# Patient Record
Sex: FEMALE | Race: WHITE | Employment: OTHER | URBAN - METROPOLITAN AREA
[De-identification: names, ages, dates, MRNs, and addresses within clinical notes are randomized per-mention and may not be internally consistent; named-entity substitution may affect disease eponyms.]

---

## 2017-01-28 ENCOUNTER — HOSPITAL ENCOUNTER (EMERGENCY)
Age: 77
Discharge: HOME | End: 2017-01-28
Attending: EMERGENCY MEDICINE
Payer: MEDICAID

## 2017-01-28 VITALS
DIASTOLIC BLOOD PRESSURE: 51 MMHG | WEIGHT: 129 LBS | RESPIRATION RATE: 18 BRPM | SYSTOLIC BLOOD PRESSURE: 123 MMHG | BODY MASS INDEX: 24.35 KG/M2 | OXYGEN SATURATION: 97 % | HEIGHT: 61 IN | TEMPERATURE: 97.9 F | HEART RATE: 90 BPM

## 2017-01-28 DIAGNOSIS — R82.81 PYURIA: Primary | ICD-10-CM

## 2017-01-28 LAB
ALBUMIN SERPL BCP-MCNC: 3.1 G/DL (ref 3.2–4.6)
ALBUMIN/GLOB SERPL: 0.8 {RATIO} (ref 1.2–3.5)
ALP SERPL-CCNC: 36 U/L (ref 50–136)
ALT SERPL-CCNC: 15 U/L (ref 12–65)
ANION GAP BLD CALC-SCNC: 10 MMOL/L (ref 7–16)
AST SERPL W P-5'-P-CCNC: 16 U/L (ref 15–37)
BACTERIA URNS QL MICRO: 0 /HPF
BASOPHILS # BLD AUTO: 0 K/UL (ref 0–0.2)
BASOPHILS # BLD: 0 % (ref 0–2)
BILIRUB SERPL-MCNC: 0.5 MG/DL (ref 0.2–1.1)
BUN SERPL-MCNC: 24 MG/DL (ref 8–23)
CALCIUM SERPL-MCNC: 9.1 MG/DL (ref 8.3–10.4)
CASTS URNS QL MICRO: NORMAL /LPF
CHLORIDE SERPL-SCNC: 101 MMOL/L (ref 98–107)
CO2 SERPL-SCNC: 28 MMOL/L (ref 21–32)
CREAT SERPL-MCNC: 1.76 MG/DL (ref 0.6–1)
CRYSTALS URNS QL MICRO: 0 /LPF
DIFFERENTIAL METHOD BLD: ABNORMAL
EOSINOPHIL # BLD: 0 K/UL (ref 0–0.8)
EOSINOPHIL NFR BLD: 0 % (ref 0.5–7.8)
EPI CELLS #/AREA URNS HPF: NORMAL /HPF
ERYTHROCYTE [DISTWIDTH] IN BLOOD BY AUTOMATED COUNT: 15.1 % (ref 11.9–14.6)
GLOBULIN SER CALC-MCNC: 4.1 G/DL (ref 2.3–3.5)
GLUCOSE SERPL-MCNC: 127 MG/DL (ref 65–100)
HCT VFR BLD AUTO: 28.6 % (ref 35.8–46.3)
HGB BLD-MCNC: 8.9 G/DL (ref 11.7–15.4)
IMM GRANULOCYTES # BLD: 0 K/UL (ref 0–0.5)
IMM GRANULOCYTES NFR BLD AUTO: 0.2 % (ref 0–5)
LYMPHOCYTES # BLD AUTO: 13 % (ref 13–44)
LYMPHOCYTES # BLD: 1.7 K/UL (ref 0.5–4.6)
MCH RBC QN AUTO: 28.5 PG (ref 26.1–32.9)
MCHC RBC AUTO-ENTMCNC: 31.1 G/DL (ref 31.4–35)
MCV RBC AUTO: 91.7 FL (ref 79.6–97.8)
MONOCYTES # BLD: 0.9 K/UL (ref 0.1–1.3)
MONOCYTES NFR BLD AUTO: 7 % (ref 4–12)
MUCOUS THREADS URNS QL MICRO: 0 /LPF
NEUTS SEG # BLD: 10.2 K/UL (ref 1.7–8.2)
NEUTS SEG NFR BLD AUTO: 80 % (ref 43–78)
OTHER OBSERVATIONS,UCOM: NORMAL
PLATELET # BLD AUTO: 192 K/UL (ref 150–450)
PMV BLD AUTO: 10.6 FL (ref 10.8–14.1)
POTASSIUM SERPL-SCNC: 3.9 MMOL/L (ref 3.5–5.1)
PROT SERPL-MCNC: 7.2 G/DL (ref 6.3–8.2)
RBC # BLD AUTO: 3.12 M/UL (ref 4.05–5.25)
RBC #/AREA URNS HPF: NORMAL /HPF
SODIUM SERPL-SCNC: 139 MMOL/L (ref 136–145)
WBC # BLD AUTO: 12.7 K/UL (ref 4.3–11.1)
WBC URNS QL MICRO: NORMAL /HPF

## 2017-01-28 PROCEDURE — 81015 MICROSCOPIC EXAM OF URINE: CPT | Performed by: EMERGENCY MEDICINE

## 2017-01-28 PROCEDURE — 99285 EMERGENCY DEPT VISIT HI MDM: CPT | Performed by: EMERGENCY MEDICINE

## 2017-01-28 PROCEDURE — 80053 COMPREHEN METABOLIC PANEL: CPT | Performed by: EMERGENCY MEDICINE

## 2017-01-28 PROCEDURE — 77030011943

## 2017-01-28 PROCEDURE — 51701 INSERT BLADDER CATHETER: CPT | Performed by: EMERGENCY MEDICINE

## 2017-01-28 PROCEDURE — 96361 HYDRATE IV INFUSION ADD-ON: CPT | Performed by: EMERGENCY MEDICINE

## 2017-01-28 PROCEDURE — 87086 URINE CULTURE/COLONY COUNT: CPT | Performed by: EMERGENCY MEDICINE

## 2017-01-28 PROCEDURE — 74011250636 HC RX REV CODE- 250/636: Performed by: EMERGENCY MEDICINE

## 2017-01-28 PROCEDURE — 85025 COMPLETE CBC W/AUTO DIFF WBC: CPT | Performed by: EMERGENCY MEDICINE

## 2017-01-28 PROCEDURE — 96360 HYDRATION IV INFUSION INIT: CPT | Performed by: EMERGENCY MEDICINE

## 2017-01-28 PROCEDURE — 81003 URINALYSIS AUTO W/O SCOPE: CPT | Performed by: EMERGENCY MEDICINE

## 2017-01-28 RX ADMIN — SODIUM CHLORIDE 1000 ML: 900 INJECTION, SOLUTION INTRAVENOUS at 18:26

## 2017-01-28 NOTE — ED NOTES
Pt to er with family . .. Pt c/o no pain. . Pt c/o no n/v/d. .. Family sts they \"think\" pt has a UTI. .. Pt alert and confused. .. Family sts pt has been confused for a long time. ..

## 2017-01-28 NOTE — ED TRIAGE NOTES
Per patient and sister decreased appetite \"for a long time\" later clarified months states she unable to urinate. Patient drinking an apple juice at this time. Patients family member is worried she may have uti.

## 2017-01-28 NOTE — ED PROVIDER NOTES
HPI Comments: Patient comes in with her sister with concerned patient may have a urinary tract infection. She tends to get more confused than her baseline confusion when she has these. She has some visual hallucinations at times that seem worse over the last less than one week. Of note patient has a baseline diagnosis of hallucinations and her new [facility] chart that sister provides. They're been no fever. She has no nausea vomiting or diarrhea. She has had decreased appetite over a quite extended period and during the same time this had some gradual weight loss. Patient denies any discomfort. She denies any flank pain. Patient is a 68 y.o. female presenting with other event. The history is provided by the patient and a relative (twin sister). Other   This is a recurrent problem. Episode onset: past few days. The problem has not changed since onset. Nothing aggravates the symptoms. She has tried nothing for the symptoms. Past Medical History:   Diagnosis Date    TYLER (acute kidney injury) ([facility] [address] 5/30/2012    Colitis 4/2012    Duodenal ulcer 2005    GI bleed     Heart disease     Hypercholesterolemia     Hypertension     Hypothyroidism     Neurogenic bladder, NOS     Urinary hesitancy        Past Surgical History:   Procedure Laterality Date    Hx gyn  1966     hysterectomy    Hx other surgical  8/09     stents in bilat legs         Family History:   Problem Relation Age of Onset    Heart Attack Father     Hypertension Other      gen fam hx       Social History     Social History    Marital status:      Spouse name: N/A    Number of children: N/A    Years of education: N/A     Occupational History    Not on file.      Social History Main Topics    Smoking status: Former Smoker     Packs/day: 2.00     Years: 50.00     Types: Cigarettes     Quit date: 1/1/2002    Smokeless tobacco: Not on file      Comment: Quit smoking in 2002--1-2 PPD    Alcohol use No    Drug use: No    Sexual activity: Not on file     Other Topics Concern    Not on file     Social History Narrative    Lives with . 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 ALLERGIES: Hydrocodone-acetaminophen; Ibuprofen; Lipitor [atorvastatin]; Naprosyn [naproxen]; Neomycin-bacitracin-polymyxin; Neosporin [benzalkonium chloride]; Other medication; Sulfa (sulfonamide antibiotics); Tylenol [acetaminophen]; and Yellow dye    Review of Systems   Constitutional: Negative for chills, diaphoresis and fever. HENT: Negative. Respiratory: Negative for cough. Cardiovascular: Negative. Gastrointestinal: Negative. Negative for abdominal distention, constipation, diarrhea and nausea. Genitourinary: Negative for difficulty urinating, dysuria, flank pain and frequency. Musculoskeletal: Negative. Psychiatric/Behavioral: Positive for confusion and decreased concentration. E baseline confusion and decreased concentration is close to her baseline on review. Of note she answers me appropriately and consistently throughout my encounter. All other systems reviewed and are negative. Vitals:    01/28/17 1608   BP: 134/85   Pulse: 91   Resp: 18   Temp: 98.3 °F (36.8 °C)   SpO2: 96%   Weight: 58.5 kg (129 lb)   Height: 5' 1\" (1.549 m)            Physical Exam   Constitutional: She appears well-developed and well-nourished. No distress. HENT:   Head: Atraumatic. Eyes: No scleral icterus. Neck: Neck supple. Cardiovascular: Normal rate. Pulmonary/Chest: Effort normal. No respiratory distress. She has no wheezes. Abdominal: Soft. There is no tenderness. There is no rebound. No CVA tenderness   Musculoskeletal: Normal range of motion. Neurological: She is alert. She exhibits normal muscle tone. Coordination normal.   Patient has history of dementia but actually is quite interactive and appropriate during my encounter   Skin: Skin is warm and dry.    Psychiatric: Thought content normal.   Nursing note and vitals reviewed. MDM  Number of Diagnoses or Management Options  Pyuria:   Diagnosis management comments: Ear with some mild pyuria with no bacteria noted she overall has no other infectious changes she has no cough or sputum she possibly is a little bit volume depleted. She has no fever noted by family. She has attentive family in general we will observe her closely.        Amount and/or Complexity of Data Reviewed  Clinical lab tests: ordered and reviewed    Risk of Complications, Morbidity, and/or Mortality  Presenting problems: moderate  Diagnostic procedures: low  Management options: moderate  General comments: Urine culture done and will contact patient if he requires antibiotics  Patient is to recheck with any worsening or new symptoms      ED Course       Procedures

## 2017-01-29 NOTE — ED NOTES
Discharge instructions reviewed with patient with sister. They verbalize understanding. Patient out of ED via wheelchair. Sister with paperwork in hand.

## 2017-01-31 LAB
BACTERIA SPEC CULT: NORMAL
SERVICE CMNT-IMP: NORMAL

## 2017-02-01 NOTE — PROGRESS NOTES
Patient's sister called for urine culture results - due to patient having dementia and urinary hesitancy at time of presenting to ED. No growth on urine after 2 days. Patient's sister voices understanding, agrees to follow up with family doctor. States pt has been urinating a little bit more since being dispo'd from ED.